# Patient Record
Sex: MALE | Race: WHITE | NOT HISPANIC OR LATINO
[De-identification: names, ages, dates, MRNs, and addresses within clinical notes are randomized per-mention and may not be internally consistent; named-entity substitution may affect disease eponyms.]

---

## 2022-10-10 ENCOUNTER — NON-APPOINTMENT (OUTPATIENT)
Age: 77
End: 2022-10-10

## 2022-10-10 ENCOUNTER — APPOINTMENT (OUTPATIENT)
Dept: OPHTHALMOLOGY | Facility: CLINIC | Age: 77
End: 2022-10-10

## 2022-10-10 PROBLEM — Z00.00 ENCOUNTER FOR PREVENTIVE HEALTH EXAMINATION: Status: ACTIVE | Noted: 2022-10-10

## 2022-10-10 PROCEDURE — 92004 COMPRE OPH EXAM NEW PT 1/>: CPT

## 2022-10-10 PROCEDURE — 92250 FUNDUS PHOTOGRAPHY W/I&R: CPT

## 2023-04-06 ENCOUNTER — APPOINTMENT (OUTPATIENT)
Dept: UROLOGY | Facility: CLINIC | Age: 78
End: 2023-04-06

## 2023-08-27 ENCOUNTER — NON-APPOINTMENT (OUTPATIENT)
Age: 78
End: 2023-08-27

## 2023-08-30 ENCOUNTER — APPOINTMENT (OUTPATIENT)
Dept: PULMONOLOGY | Facility: CLINIC | Age: 78
End: 2023-08-30
Payer: MEDICARE

## 2023-08-30 ENCOUNTER — APPOINTMENT (OUTPATIENT)
Dept: CT IMAGING | Facility: IMAGING CENTER | Age: 78
End: 2023-08-30

## 2023-08-30 ENCOUNTER — OUTPATIENT (OUTPATIENT)
Dept: OUTPATIENT SERVICES | Facility: HOSPITAL | Age: 78
LOS: 1 days | End: 2023-08-30
Payer: MEDICARE

## 2023-08-30 VITALS
DIASTOLIC BLOOD PRESSURE: 75 MMHG | WEIGHT: 158 LBS | HEART RATE: 77 BPM | OXYGEN SATURATION: 95 % | RESPIRATION RATE: 15 BRPM | BODY MASS INDEX: 25.39 KG/M2 | HEIGHT: 66 IN | SYSTOLIC BLOOD PRESSURE: 135 MMHG | TEMPERATURE: 97.8 F

## 2023-08-30 DIAGNOSIS — R06.02 SHORTNESS OF BREATH: ICD-10-CM

## 2023-08-30 DIAGNOSIS — R91.8 OTHER NONSPECIFIC ABNORMAL FINDING OF LUNG FIELD: ICD-10-CM

## 2023-08-30 DIAGNOSIS — U07.1 COVID-19: ICD-10-CM

## 2023-08-30 PROCEDURE — G0296 VISIT TO DETERM LDCT ELIG: CPT

## 2023-08-30 PROCEDURE — 71275 CT ANGIOGRAPHY CHEST: CPT | Mod: 26,MH

## 2023-08-30 PROCEDURE — 99205 OFFICE O/P NEW HI 60 MIN: CPT

## 2023-08-30 PROCEDURE — 71275 CT ANGIOGRAPHY CHEST: CPT

## 2023-08-30 NOTE — REVIEW OF SYSTEMS
[Fatigue] : fatigue [Cough] : cough [Dyspnea] : dyspnea [Wheezing] : wheezing [SOB on Exertion] : sob on exertion [Thyroid Problem] : thyroid problem [Negative] : Psychiatric [Fever] : no fever [Recent Wt Gain (___ Lbs)] : ~T no recent weight gain [EDS] : no eds [Chills] : no chills [Poor Appetite] : no poor appetite [Recent Wt Loss (___ Lbs)] : ~T no recent weight loss [Hemoptysis] : no hemoptysis [Chest Tightness] : no chest tightness [Frequent URIs] : no frequent URIs [Sputum] : no sputum [Pleuritic Pain] : no pleuritic pain [A.M. Dry Mouth] : no a.m. dry mouth [Diabetes] : no diabetes [Obesity] : no obesity

## 2023-08-30 NOTE — REASON FOR VISIT
[Initial] : an initial visit [Abnormal CXR/ Chest CT] : an abnormal CXR/ chest CT [Cough] : cough [Shortness of Breath] : shortness of breath [Spouse] : spouse

## 2023-08-30 NOTE — ASSESSMENT
[FreeTextEntry1] : Plan:  -CT with contrast ordered, treatment plan to be reviewed upon results -Continue antibiotics and low dose aspirin pending CT scan results -Reviewed recommendation for low dose CT Scan for 15yrs after cessation of cigarette smoking  Attending: agree with above pt accompanied by his wife - who is a physician COVID  - 8/15. second infection. First 11/22, mild, tx paxlovid This time sx also mild, and he took paxlovid for 7 days ("to avoid rebound" per wife) Mild sx, runny nose and cough - improved Then several days ago, more cough, OLIVER when walking Had a CXR , Iza, 2 days ago. Report says increased interstitial markings, poss rul infilt? Another family member, physician, started him on azithro and cefpodaxime Wife very concerned he has a PE, also started him on a baby asa They have isolating  since 8/16 No sig PMH CTA ordered - being done today  Addendum: CTA with a 5.8 cm RUL mass, extensive rt hilar adenopathy narrwong bronchus, b/l solid nodules, nonspecific rt infiltrate. d/w pt and wife by phone. They understand significance, highly suspicious malig. I am arranging for a bronchoscopy asap for dx . PET ordered, brain MRI - they will call to schedule complete the abx he is taking - as could be post-obst pna

## 2023-08-30 NOTE — HISTORY OF PRESENT ILLNESS
[Former] : former [Never] : never [TextBox_4] : Patient is a 77y/o M with medical history of Hypothyroidism on Synthroid, and with no other significant medical history, who presents today with wife to establish pulmonary care S/P Covid diagnosis 8/16 and completion of seven days of Paxlovid.  Patient is fully vaccinated and boosted, reports this is second time testing positive for Covid. Symptoms initially mild, however has noticed increased dry cough, wheezing, SOB on exertion, fatigue and with episode of bronchospasm while eating, and with X-ray that shows consolidation in lung.  Started on Azithromycin (1x dly) and Cephalexin (2 times daily) 2 days ago, also taking daily low dose aspirin.  Denies fever, chills, chest tightness, hoarseness, change in voice, or rhinorrhea, does not use any inhalers. With no history of seasonal allergies. Walked in office today, oxygen saturation is 95%.    Has a history of smoking 1 pack cigarettes for over 30 years, quit 8 years ago. Has not received surveillance lung cancer screenings. No pets, and with not occupation or environmental exposure noted. [TextBox_11] : 1 [TextBox_13] : 30 [YearQuit] : 2012

## 2023-08-31 ENCOUNTER — NON-APPOINTMENT (OUTPATIENT)
Age: 78
End: 2023-08-31

## 2023-09-01 ENCOUNTER — NON-APPOINTMENT (OUTPATIENT)
Age: 78
End: 2023-09-01

## 2023-09-01 ENCOUNTER — APPOINTMENT (OUTPATIENT)
Dept: NUCLEAR MEDICINE | Facility: IMAGING CENTER | Age: 78
End: 2023-09-01
Payer: MEDICARE

## 2023-09-01 ENCOUNTER — APPOINTMENT (OUTPATIENT)
Dept: NUCLEAR MEDICINE | Facility: IMAGING CENTER | Age: 78
End: 2023-09-01

## 2023-09-01 ENCOUNTER — OUTPATIENT (OUTPATIENT)
Dept: OUTPATIENT SERVICES | Facility: HOSPITAL | Age: 78
LOS: 1 days | End: 2023-09-01
Payer: MEDICARE

## 2023-09-01 DIAGNOSIS — R91.8 OTHER NONSPECIFIC ABNORMAL FINDING OF LUNG FIELD: ICD-10-CM

## 2023-09-01 PROCEDURE — 78815 PET IMAGE W/CT SKULL-THIGH: CPT

## 2023-09-01 PROCEDURE — 78815 PET IMAGE W/CT SKULL-THIGH: CPT | Mod: 26,PI,MH

## 2023-09-01 PROCEDURE — A9552: CPT

## 2023-09-10 ENCOUNTER — APPOINTMENT (OUTPATIENT)
Dept: MRI IMAGING | Facility: CLINIC | Age: 78
End: 2023-09-10

## 2023-10-31 ENCOUNTER — APPOINTMENT (OUTPATIENT)
Dept: PULMONOLOGY | Facility: CLINIC | Age: 78
End: 2023-10-31

## 2023-10-31 DIAGNOSIS — R05.9 COUGH, UNSPECIFIED: ICD-10-CM

## 2023-10-31 RX ORDER — CAMPHOR 0.45 %
25 GEL (GRAM) TOPICAL
Qty: 10 | Refills: 0 | Status: ACTIVE | COMMUNITY
Start: 2023-10-31 | End: 1900-01-01

## 2023-10-31 RX ORDER — FLUTICASONE PROPIONATE 50 UG/1
50 SPRAY, METERED NASAL
Qty: 1 | Refills: 2 | Status: ACTIVE | COMMUNITY
Start: 2023-10-31 | End: 1900-01-01

## 2023-10-31 RX ORDER — LORATADINE 5 MG/5 ML
0.05 SOLUTION, ORAL ORAL TWICE DAILY
Qty: 1 | Refills: 0 | Status: ACTIVE | COMMUNITY
Start: 2023-10-31 | End: 1900-01-01

## 2023-12-12 ENCOUNTER — RESULT REVIEW (OUTPATIENT)
Age: 78
End: 2023-12-12

## 2023-12-12 ENCOUNTER — APPOINTMENT (OUTPATIENT)
Dept: HEMATOLOGY ONCOLOGY | Facility: CLINIC | Age: 78
End: 2023-12-12
Payer: MEDICARE

## 2023-12-12 VITALS
OXYGEN SATURATION: 96 % | WEIGHT: 156.25 LBS | SYSTOLIC BLOOD PRESSURE: 112 MMHG | HEART RATE: 84 BPM | RESPIRATION RATE: 16 BRPM | TEMPERATURE: 97.8 F | BODY MASS INDEX: 26.03 KG/M2 | HEIGHT: 65 IN | DIASTOLIC BLOOD PRESSURE: 68 MMHG

## 2023-12-12 DIAGNOSIS — E03.9 HYPOTHYROIDISM, UNSPECIFIED: ICD-10-CM

## 2023-12-12 DIAGNOSIS — Z80.0 FAMILY HISTORY OF MALIGNANT NEOPLASM OF DIGESTIVE ORGANS: ICD-10-CM

## 2023-12-12 DIAGNOSIS — C34.90 MALIGNANT NEOPLASM OF UNSPECIFIED PART OF UNSPECIFIED BRONCHUS OR LUNG: ICD-10-CM

## 2023-12-12 DIAGNOSIS — Z87.891 PERSONAL HISTORY OF NICOTINE DEPENDENCE: ICD-10-CM

## 2023-12-12 PROCEDURE — 99205 OFFICE O/P NEW HI 60 MIN: CPT

## 2023-12-12 PROCEDURE — 36415 COLL VENOUS BLD VENIPUNCTURE: CPT

## 2023-12-12 RX ORDER — LEVOTHYROXINE SODIUM 0.1 MG/1
100 TABLET ORAL
Refills: 0 | Status: ACTIVE | COMMUNITY

## 2023-12-12 RX ORDER — OMEPRAZOLE 40 MG/1
40 CAPSULE, DELAYED RELEASE ORAL
Qty: 90 | Refills: 2 | Status: ACTIVE | COMMUNITY
Start: 2023-12-12 | End: 1900-01-01

## 2023-12-15 ENCOUNTER — NON-APPOINTMENT (OUTPATIENT)
Age: 78
End: 2023-12-15

## 2023-12-22 ENCOUNTER — NON-APPOINTMENT (OUTPATIENT)
Age: 78
End: 2023-12-22

## 2024-02-25 NOTE — ASSESSMENT
[FreeTextEntry1] : 78-year-old male presents today for initial consultation of lung cancer, referred by Dr. Urrutia.  Patient initially presented to pulmonologist with cough, SOB and abnormal chest xray in August 2023.  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 Barone- under surveillance- no tx or surgery.    Imaging- CTA 8/30/23-  No pulmonary embolus is noted. There is a 5.8 cm mass containing lucency in the right upper lobe. Primary differential diagnostic consideration is lung carcinoma. Additional solid nodules noted within both lungs likely represent metastasis. Patchy opacities are noted within the right lung. Exact etiology is unclear. Right paratracheal, subcarinal and right hilar adenopathy.  PET CT 9/1/23- 1. FDG avid right upper lobe mass with multiple right-sided FDG avid solid nodules are grossly unchanged in size and appearance as compared to CT dated 8/30/2023. Findings likely represent a primary lung neoplasm with lung metastases. 2. FDG-avid bilateral patchy lung opacities likely represent superimposed infection. 3. FDG avid right perihilar and ipsilateral mediastinal lymphadenopathy is compatible with metastatic disease. 4. Several FDG-avid predominantly lytic lesions in the axial skeleton are compatible with metastatic disease. 5. Nonspecific increased FDG avidity within the thyroid gland suggests thyroiditis. Recommend clinical correlation.  MRI Brain 9/2/23- A 1.7cm extracranial soft tissue mass is present over the right parietal bone.  No intracranial or cerebral metastases are demonstrated.  Minor chronic leukoencephalopathy is present and the exam of the brain is otherwise normal.    Pathology 9/13/23- Cytologic Dx 1. Lymph node, right hilar LN's, right transbrocnhial FNA Positive for malignant cells. Adenocarcinoma, compatible with lung primary  He has been seeing Dr. Owen Awad at [facility] [place].  He was initially started on Carboplatin, Premetrexed and Pembro Sept 2023- last infusion 12/5/23, patient going to [place]. Guardant 360 - to define next line of tx.  Reviewed lung ca - st 4, declining PS, involve palliative care. Patient offered genetic testing. We discussed: Plan for genetic panel.   Reviewed with patient impact of positive vs negative results and that test might not be informative or .  We discussed that blood related family members might be carrying the same genetic mutation. Test confidentiality.  Options or limitations of medical surveillance and strategies for prevention after genetic testing results. Importance of sharing genetic test results with at-risk relatives they might benefit from this information.  Plan for follow up after testing.

## 2024-02-25 NOTE — HISTORY OF PRESENT ILLNESS
[Disease: _____________________] : Disease: [unfilled] [AJCC Stage: ____] : AJCC Stage: [unfilled] [de-identified] : 78-year-old male presents today for initial consultation of lung cancer, referred by Dr. Urrutia.  Patient initially presented to pulmonologist with cough, SOB and abnormal chest xray in August 2023.  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 Barone- under surveillance- no tx or surgery.    Imaging- CTA 8/30/23-  No pulmonary embolus is noted. There is a 5.8 cm mass containing lucency in the right upper lobe. Primary differential diagnostic consideration is lung carcinoma. Additional solid nodules noted within both lungs likely represent metastasis. Patchy opacities are noted within the right lung. Exact etiology is unclear. Right paratracheal, subcarinal and right hilar adenopathy.  PET CT 9/1/23- 1. FDG avid right upper lobe mass with multiple right-sided FDG avid solid nodules are grossly unchanged in size and appearance as compared to CT dated 8/30/2023. Findings likely represent a primary lung neoplasm with lung metastases. 2. FDG-avid bilateral patchy lung opacities likely represent superimposed infection. 3. FDG avid right perihilar and ipsilateral mediastinal lymphadenopathy is compatible with metastatic disease. 4. Several FDG-avid predominantly lytic lesions in the axial skeleton are compatible with metastatic disease. 5. Nonspecific increased FDG avidity within the thyroid gland suggests thyroiditis. Recommend clinical correlation.  MRI Brain 9/2/23- A 1.7cm extracranial soft tissue mass is present over the right parietal bone.  No intracranial or cerebral metastases are demonstrated.  Minor chronic leukoencephalopathy is present and the exam of the brain is otherwise normal.    Pathology 9/13/23- Cytologic Dx 1. Lymph node, right hilar LN's, right transbrocnhial FNA Positive for malignant cells. Adenocarcinoma, compatible with lung primary  He has been seeing Dr. Owen Awad at [facility] [place], [place].  He was initially started on Carboplatin, Premetrexed and Pembro Sept 2023- last infusion 12/5/23 due 12/26/23.    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

## 2024-04-15 NOTE — ASSESSMENT
[FreeTextEntry1] : 78-year-old male presents today for initial consultation of lung cancer, referred by Dr. Urrutia.  Patient initially presented to pulmonologist with cough, SOB and abnormal chest xray in August 2023.  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 Barone- under surveillance- no tx or surgery.    Imaging- CTA 8/30/23-  No pulmonary embolus is noted. There is a 5.8 cm mass containing lucency in the right upper lobe. Primary differential diagnostic consideration is lung carcinoma. Additional solid nodules noted within both lungs likely represent metastasis. Patchy opacities are noted within the right lung. Exact etiology is unclear. Right paratracheal, subcarinal and right hilar adenopathy.  PET CT 9/1/23- 1. FDG avid right upper lobe mass with multiple right-sided FDG avid solid nodules are grossly unchanged in size and appearance as compared to CT dated 8/30/2023. Findings likely represent a primary lung neoplasm with lung metastases. 2. FDG-avid bilateral patchy lung opacities likely represent superimposed infection. 3. FDG avid right perihilar and ipsilateral mediastinal lymphadenopathy is compatible with metastatic disease. 4. Several FDG-avid predominantly lytic lesions in the axial skeleton are compatible with metastatic disease. 5. Nonspecific increased FDG avidity within the thyroid gland suggests thyroiditis. Recommend clinical correlation.  MRI Brain 9/2/23- A 1.7cm extracranial soft tissue mass is present over the right parietal bone.  No intracranial or cerebral metastases are demonstrated.  Minor chronic leukoencephalopathy is present and the exam of the brain is otherwise normal.    Pathology 9/13/23- Cytologic Dx 1. Lymph node, right hilar LN's, right transbrocnhial FNA Positive for malignant cells. Adenocarcinoma, compatible with lung primary  He has been seeing Dr. Owen Awad at [facility] [place].  He was initially started on Carboplatin, Premetrexed and Pembro Sept 2023- last infusion 12/5/23, patient going to [place]. Guardant 360 - to define next line of tx.  Reviewed lung ca - st 4, declining PS, involve palliative care. Patient offered genetic testing. We discussed: Plan for genetic panel.   Reviewed with patient impact of positive vs negative results and that test might not be informative or .  We discussed that blood related family members might be carrying the same genetic mutation. Test confidentiality.  Options or limitations of medical surveillance and strategies for prevention after genetic testing results. Importance of sharing genetic test results with at-risk relatives they might benefit from this information.  Plan for follow up after testing.

## 2024-04-15 NOTE — HISTORY OF PRESENT ILLNESS
[Disease: _____________________] : Disease: [unfilled] [AJCC Stage: ____] : AJCC Stage: [unfilled] [de-identified] : 78-year-old male presents today for initial consultation of lung cancer, referred by Dr. Urrutia.  Patient initially presented to pulmonologist with cough, SOB and abnormal chest xray in August 2023.  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 Barone- under surveillance- no tx or surgery.    Imaging- CTA 8/30/23-  No pulmonary embolus is noted. There is a 5.8 cm mass containing lucency in the right upper lobe. Primary differential diagnostic consideration is lung carcinoma. Additional solid nodules noted within both lungs likely represent metastasis. Patchy opacities are noted within the right lung. Exact etiology is unclear. Right paratracheal, subcarinal and right hilar adenopathy.  PET CT 9/1/23- 1. FDG avid right upper lobe mass with multiple right-sided FDG avid solid nodules are grossly unchanged in size and appearance as compared to CT dated 8/30/2023. Findings likely represent a primary lung neoplasm with lung metastases. 2. FDG-avid bilateral patchy lung opacities likely represent superimposed infection. 3. FDG avid right perihilar and ipsilateral mediastinal lymphadenopathy is compatible with metastatic disease. 4. Several FDG-avid predominantly lytic lesions in the axial skeleton are compatible with metastatic disease. 5. Nonspecific increased FDG avidity within the thyroid gland suggests thyroiditis. Recommend clinical correlation.  MRI Brain 9/2/23- A 1.7cm extracranial soft tissue mass is present over the right parietal bone.  No intracranial or cerebral metastases are demonstrated.  Minor chronic leukoencephalopathy is present and the exam of the brain is otherwise normal.    Pathology 9/13/23- Cytologic Dx 1. Lymph node, right hilar LN's, right transbrocnhial FNA Positive for malignant cells. Adenocarcinoma, compatible with lung primary  He has been seeing Dr. Owen Awad at [facility] [place].  He was initially started on Carboplatin, Premetrexed and Pembro Sept 2023- last infusion 12/5/23 due 12/26/23.    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

## 2024-04-18 ENCOUNTER — APPOINTMENT (OUTPATIENT)
Dept: HEMATOLOGY ONCOLOGY | Facility: CLINIC | Age: 79
End: 2024-04-18